# Patient Record
Sex: FEMALE | Race: OTHER | Employment: FULL TIME | URBAN - METROPOLITAN AREA
[De-identification: names, ages, dates, MRNs, and addresses within clinical notes are randomized per-mention and may not be internally consistent; named-entity substitution may affect disease eponyms.]

---

## 2017-12-16 ENCOUNTER — TELEPHONE (OUTPATIENT)
Dept: FAMILY PLANNING/WOMEN'S HEALTH CLINIC | Age: 57
End: 2017-12-16

## 2017-12-16 NOTE — TELEPHONE ENCOUNTER
Spoke with pt about rescheduling her missed mammogram appt on 12/6/17. Pt states she is currently staying in [place] with her mother. She states she had her mammo done last week while in [place]. Pt appreciated us calling and will contact us when she is back in [place].